# Patient Record
Sex: FEMALE
[De-identification: names, ages, dates, MRNs, and addresses within clinical notes are randomized per-mention and may not be internally consistent; named-entity substitution may affect disease eponyms.]

---

## 2021-04-13 ENCOUNTER — HOSPITAL ENCOUNTER (OUTPATIENT)
Dept: HOSPITAL 93 - LAB | Age: 36
Discharge: HOME | End: 2021-04-13
Attending: OBSTETRICS & GYNECOLOGY
Payer: COMMERCIAL

## 2021-04-13 DIAGNOSIS — N39.0: Primary | ICD-10-CM

## 2021-12-17 ENCOUNTER — HOSPITAL ENCOUNTER (OUTPATIENT)
Dept: HOSPITAL 93 - LAB | Age: 36
Discharge: HOME | End: 2021-12-17
Attending: OBSTETRICS & GYNECOLOGY
Payer: COMMERCIAL

## 2021-12-17 DIAGNOSIS — N95.1: ICD-10-CM

## 2021-12-17 DIAGNOSIS — E78.49: Primary | ICD-10-CM

## 2021-12-17 DIAGNOSIS — R19.00: ICD-10-CM

## 2021-12-17 DIAGNOSIS — E34.8: ICD-10-CM

## 2022-12-06 ENCOUNTER — APPOINTMENT (RX ONLY)
Dept: URBAN - METROPOLITAN AREA CLINIC 15 | Facility: CLINIC | Age: 37
Setting detail: DERMATOLOGY
End: 2022-12-06

## 2022-12-06 DIAGNOSIS — Z41.9 ENCOUNTER FOR PROCEDURE FOR PURPOSES OTHER THAN REMEDYING HEALTH STATE, UNSPECIFIED: ICD-10-CM

## 2022-12-06 PROCEDURE — ? MIRADRY

## 2022-12-06 NOTE — HPI: OTHER
Condition:: Hyperhidrosis and unwanted odor from her armpits
Please Describe Your Condition:: is an established patient who is being seen for a chief complaint of Hyperhidrosis and unwanted odor from her armpits. The patient is here to have her first MiraDry procedure performed on her armpits today.

## 2022-12-06 NOTE — PROCEDURE: MIRADRY
Treatment Number Location 2: 1
Anesthesia Volume In Cc: [address] [facility]
Post-Care Instructions: I reviewed with the patient in detail post-care instructions. Patient should avoid sun until area fully healed. \\n\\nI emphasized the importance of cleansing both axillae with an antiseptic Soap (Hibiclens Cleanser) twice a day and to apply antibiotic ointment twice a day for 7 days. Besides that I insisted on the importance of taking the anti- inflammatory NSAID Ibuprofens three to four times a day for 5 to 7 days, and applying the ice pack (wrapped in a towel) as frequent as possible in the first 48 to 96 hours. \\n\\nThe patient will take Cephalexin 500 mg capsule, 1 po QID for 7 days as a prophylaxis.
Treatment Energy Level Location 2: 5
Comments: The procedure was well tolerated, with no complications. \\nBiotip [id number]
Anesthesia Type: 2% lidocaine with epinephrine
Number Of Placements Location 2: [address] [facility]
Initial Location: Right and Left Axilla
Number Of Placements: [address]
Detail Level: Detailed
Template Size: 60 x 140
Template Size Loation 2: 80 x 160
Consent: Written consent obtained, risks reviewed including but not limited to crusting, scabbing, blistering, scarring, darker or lighter pigmentary change, incomplete improvement of hyperhidrosis, wrinkles.